# Patient Record
Sex: FEMALE | Race: WHITE
[De-identification: names, ages, dates, MRNs, and addresses within clinical notes are randomized per-mention and may not be internally consistent; named-entity substitution may affect disease eponyms.]

---

## 2020-06-16 ENCOUNTER — HOSPITAL ENCOUNTER (EMERGENCY)
Dept: HOSPITAL 62 - ER | Age: 30
Discharge: HOME | End: 2020-06-16
Payer: MEDICAID

## 2020-06-16 VITALS — SYSTOLIC BLOOD PRESSURE: 115 MMHG | DIASTOLIC BLOOD PRESSURE: 59 MMHG

## 2020-06-16 DIAGNOSIS — R42: ICD-10-CM

## 2020-06-16 DIAGNOSIS — Z3A.01: ICD-10-CM

## 2020-06-16 DIAGNOSIS — R11.0: ICD-10-CM

## 2020-06-16 DIAGNOSIS — O99.331: ICD-10-CM

## 2020-06-16 DIAGNOSIS — F17.200: ICD-10-CM

## 2020-06-16 DIAGNOSIS — Z91.018: ICD-10-CM

## 2020-06-16 DIAGNOSIS — O26.891: Primary | ICD-10-CM

## 2020-06-16 LAB
ADD MANUAL DIFF: NO
ALBUMIN SERPL-MCNC: 4.5 G/DL (ref 3.5–5)
ALP SERPL-CCNC: 48 U/L (ref 38–126)
ANION GAP SERPL CALC-SCNC: 8 MMOL/L (ref 5–19)
APPEARANCE UR: CLEAR
APTT PPP: YELLOW S
AST SERPL-CCNC: 18 U/L (ref 14–36)
BASOPHILS # BLD AUTO: 0 10^3/UL (ref 0–0.2)
BASOPHILS NFR BLD AUTO: 0.4 % (ref 0–2)
BILIRUB DIRECT SERPL-MCNC: 0 MG/DL (ref 0–0.4)
BILIRUB SERPL-MCNC: 0.4 MG/DL (ref 0.2–1.3)
BILIRUB UR QL STRIP: NEGATIVE
BUN SERPL-MCNC: 8 MG/DL (ref 7–20)
CALCIUM: 9.5 MG/DL (ref 8.4–10.2)
CHLORIDE SERPL-SCNC: 105 MMOL/L (ref 98–107)
CO2 SERPL-SCNC: 24 MMOL/L (ref 22–30)
EOSINOPHIL # BLD AUTO: 0.1 10^3/UL (ref 0–0.6)
EOSINOPHIL NFR BLD AUTO: 0.6 % (ref 0–6)
ERYTHROCYTE [DISTWIDTH] IN BLOOD BY AUTOMATED COUNT: 13.7 % (ref 11.5–14)
GLUCOSE SERPL-MCNC: 97 MG/DL (ref 75–110)
GLUCOSE UR STRIP-MCNC: NEGATIVE MG/DL
HCT VFR BLD CALC: 40.5 % (ref 36–47)
HGB BLD-MCNC: 14 G/DL (ref 12–15.5)
KETONES UR STRIP-MCNC: NEGATIVE MG/DL
LYMPHOCYTES # BLD AUTO: 1.9 10^3/UL (ref 0.5–4.7)
LYMPHOCYTES NFR BLD AUTO: 19.2 % (ref 13–45)
MCH RBC QN AUTO: 31.6 PG (ref 27–33.4)
MCHC RBC AUTO-ENTMCNC: 34.6 G/DL (ref 32–36)
MCV RBC AUTO: 91 FL (ref 80–97)
MONOCYTES # BLD AUTO: 0.7 10^3/UL (ref 0.1–1.4)
MONOCYTES NFR BLD AUTO: 7.3 % (ref 3–13)
NEUTROPHILS # BLD AUTO: 7.2 10^3/UL (ref 1.7–8.2)
NEUTS SEG NFR BLD AUTO: 72.5 % (ref 42–78)
NITRITE UR QL STRIP: NEGATIVE
PH UR STRIP: 5 [PH] (ref 5–9)
PLATELET # BLD: 142 10^3/UL (ref 150–450)
POTASSIUM SERPL-SCNC: 4.3 MMOL/L (ref 3.6–5)
PROT SERPL-MCNC: 7.4 G/DL (ref 6.3–8.2)
PROT UR STRIP-MCNC: NEGATIVE MG/DL
RBC # BLD AUTO: 4.43 10^6/UL (ref 3.72–5.28)
SP GR UR STRIP: 1.01
TOTAL CELLS COUNTED % (AUTO): 100 %
UROBILINOGEN UR-MCNC: NEGATIVE MG/DL (ref ?–2)
WBC # BLD AUTO: 10 10^3/UL (ref 4–10.5)

## 2020-06-16 PROCEDURE — 36415 COLL VENOUS BLD VENIPUNCTURE: CPT

## 2020-06-16 PROCEDURE — 99284 EMERGENCY DEPT VISIT MOD MDM: CPT

## 2020-06-16 PROCEDURE — 85025 COMPLETE CBC W/AUTO DIFF WBC: CPT

## 2020-06-16 PROCEDURE — 81001 URINALYSIS AUTO W/SCOPE: CPT

## 2020-06-16 PROCEDURE — 93010 ELECTROCARDIOGRAM REPORT: CPT

## 2020-06-16 PROCEDURE — 80053 COMPREHEN METABOLIC PANEL: CPT

## 2020-06-16 PROCEDURE — 84702 CHORIONIC GONADOTROPIN TEST: CPT

## 2020-06-16 PROCEDURE — 93005 ELECTROCARDIOGRAM TRACING: CPT

## 2020-06-16 NOTE — ER DOCUMENT REPORT
ED Medical Screen (RME)





- General


Chief Complaint: Dizziness


Stated Complaint: DIZZINESS


[facility] 


[facility] - Follow [facility] as needed





- [facility]


Notes: 





20 12:41


[age]-year-old female  1 para 1 presents emergency room for complaints of 

dizziness, nausea, fatigue and sweating x1 day.  Patient states today she 

started with dizziness and nausea.  Patient states she has an issue with inner 

ear problems.  Patient states she did have a positive pregnancy test, last 

menstrual cycle was 5/15/20.  Patient states she smokes intermittently.  Is not 

taking a prenatal supplement.  Today she states she only has dizziness.  Denies 

any fevers or chills, vomiting, diarrhea.  No chest pain or shortness of breath.

 Decreased eating but drinking without issues


I have greeted and performed a rapid initial assessment of this patient.  A 

comprehensive ED assessment and evaluation of the patient, analysis of test 

results and completion of the medical decision making process will be conducted 

by additional ED providers.





PHYSICAL EXAMINATION:





GENERAL: Well-appearing, well-nourished and in no acute distress.





HEAD: Atraumatic, normocephalic.





EYES: Pupils equal round extraocular movements intact,  conjunctiva are normal.





NECK: Normal range of motion





CV: s1, s2 regular 





LUNGS: No respiratory distress





Musculoskeletal: Normal range of motion





NEUROLOGICAL:  Normal speech, normal gait. 





SKIN: Warm, Dry, normal turgor, no rashes or lesions noted.





- Related Data


Allergies/Adverse Reactions: 


                                        





lactose Allergy (Verified 20 12:30)


   


No Known Drug Allergies Allergy (Verified 13 03:05)


   











Physical Exam





- Vital signs


Vitals: 





                                        











Temp Pulse Resp BP Pulse Ox


 


 98.6 F   84   16   111/78   100 


 


 20 11:41  20 11:41  20 11:41  20 11:41  20 11:41














Course





- Vital Signs


Vital signs: 





                                        











Temp Pulse Resp BP Pulse Ox


 


 98.6 F   84   16   111/78   100 


 


 20 12:37  20 11:41  20 11:41  20 11:41  20 11:41














Doctor's Discharge





- Discharge


Referrals: 


[facility] [Primary Care Provider] - Follow up as needed

## 2020-06-16 NOTE — ER DOCUMENT REPORT
ED General





- General


Chief Complaint: Dizziness


Stated Complaint: DIZZINESS


Time Seen by Provider: 20 17:04


Primary Care Provider: 


JENNA WEST MD [ACTIVE STAFF] - Follow up as needed


GIOVANNI CESAR MD [ACTIVE STAFF] - Follow up as needed


MILES CONNOLLY MD [ACTIVE STAFF] - Follow up as needed


[facility] - Follow up as needed


Mode of Arrival: Ambulatory


Information source: Patient


TRAVEL OUTSIDE OF THE [place] IN LAST 30 DAYS: No





- HPI


Onset: Other - over the last several days


Onset/Duration: Gradual


Quality of pain: Cramping - pelvic pains


Severity: Moderate


Pain Level: 1


Associated symptoms: Other - Dizziness, nausea


Exacerbated by: Movement


Relieved by: Remaining still


Similar symptoms previously: No


Recently seen / treated by doctor: No


Notes: 





29 year old  female with no significant PMH here in the ER for dizziness and

nausea for the last several days. The patient says her last period was about a 

month ago. The patient thinks she is pregnant since she took a home pregnancy 

test which was postive but she has not had any prenatal care. The patient never 

had dizziness like this with her prior pregnancy. The patient denies recent 

fevers, chills, sweats, vomiting, diarrhea, chest pain, trouble breathing.





- Related Data


Allergies/Adverse Reactions: 


                                        





lactose Allergy (Verified 20 12:30)


   


No Known Drug Allergies Allergy (Verified 13 03:05)


   











Past Medical History





- General


Information source: Patient





- Social History


Smoking Status: Current Every Day Smoker


Frequency of alcohol use: None


Drug Abuse: None


Family History: Reviewed & Not Pertinent


Patient has suicidal ideation: No


Patient has homicidal ideation: No





Review of Systems





- Review of Systems


Constitutional: No symptoms reported


EENT: No symptoms reported


Cardiovascular: No symptoms reported


Respiratory: No symptoms reported


Gastrointestinal: No symptoms reported


Genitourinary: No symptoms reported


Female Genitourinary: Other - has not had a period for over a month


Musculoskeletal: No symptoms reported


Skin: No symptoms reported


Hematologic/Lymphatic: No symptoms reported


Neurological/Psychological: Other - Dizziness


-: Yes All other systems reviewed and negative





Physical Exam





- Vital signs


Vitals: 


                                        











Temp Pulse Resp BP Pulse Ox


 


 98.6 F   84   16   111/78   100 


 


 20 11:41  20 11:41  20 11:41  20 11:41  20 11:41














- Notes


Notes: 





GENERAL: Well-appearing, well-nourished and in no acute distress.


HEAD: Atraumatic, normocephalic.


EYES: Pupils equal round and reactive to light, extraocular movements intact, 

sclera anicteric, conjunctiva are normal.


ENT: External ears normal, nares patent, oropharynx clear without exudates.  

Moist mucous membranes.


NECK: Normal range of motion, supple without lymphadenopathy or JVD.


LUNGS: Breath sounds clear to auscultation bilaterally and equal.  No wheezes 

rales or rhonchi.


HEART: Regular rate and rhythm without murmurs, rubs or gallops.


ABDOMEN: Soft, nontender, normoactive bowel sounds.  No guarding, no rebound.  

No masses appreciated.


EXTREMITIES: Normal range of motion, no pitting or edema.  No clubbing or 

cyanosis.


NEUROLOGICAL: Cranial nerves II through XII grossly intact.  Normal speech, 

normal gait.


PSYCH: Normal mood, normal affect.


SKIN: Warm, Dry, normal turgor, no rashes or lesions noted.





Course





- Re-evaluation


Re-evalutation: 





20 17:47


The patient is pregnant and she has been experiencing dizzy spells. Patient told

she is likely shunting fluid to her fetus and that she needs to increase her 

intake of oral fluids. Patient's labs and EKG are unremarkable. Patient's Beta 

HCG is in the 6000s. Patient offered an OB Ultrasound to evaluate the status of 

her pregnancy but she preferred to follow up as an outpatient with an OB/GYN 

Doctor which I think is very reasonable. No need for emergent ultrasound at this

time since she is not having any real pelvic pains (just minor cramps). Patient 

told to start taking a prenatal vitamin and to follow up with an OB/GYN Doctor 

ASAP.





- Vital Signs


Vital signs: 


                                        











Temp Pulse Resp BP Pulse Ox


 


 98.6 F   71   16   115/59 L  100 


 


 20 12:37  20 17:13  20 11:41  20 17:13  20 11:41














- Laboratory


Result Diagrams: 


                                 20 13:37





                                 20 13:37


Laboratory results interpreted by me: 


                                        











  20





  13:37 13:37 13:37


 


Plt Count  142 L  


 


Beta HCG, Quant   6264.50 H 


 


Urine Blood    SMALL H














- EKG Interpretation by Me


EKG shows normal: Sinus rhythm, Axis, Intervals, QRS Complexes, ST-T Waves


Rate: Normal


Rhythm: NSR





Discharge





- Discharge


Clinical Impression: 


 Dizziness





Pregnancy


Qualifiers:


 Weeks of gestation: unspecified Qualified Code(s): Z34.90 - Encounter for 

supervision of normal pregnancy, unspecified, unspecified trimester





Condition: Stable


Disposition: HOME, SELF-CARE


Instructions:  Dizziness ([facility]), Pregnancy ([facility])


Additional Instructions: 


You are pregnant. Drink plenty of fluids in the days to come. Take a prenatal 

vitamin. Follow up with and OB/GYN Doctor as soon as possible. 


[facility] 


[facility] - Follow [facility] as needed


MILES CONNOLLY MD [ACTIVE STAFF] - Follow up as needed


GIOVANNI CESAR MD [ACTIVE STAFF] - Follow up as needed


JENNA WEST MD [ACTIVE STAFF] - Follow up as needed